# Patient Record
Sex: FEMALE | Race: WHITE
[De-identification: names, ages, dates, MRNs, and addresses within clinical notes are randomized per-mention and may not be internally consistent; named-entity substitution may affect disease eponyms.]

---

## 2018-02-07 ENCOUNTER — HOSPITAL ENCOUNTER (EMERGENCY)
Dept: HOSPITAL 74 - JER | Age: 53
Discharge: HOME | End: 2018-02-07
Payer: COMMERCIAL

## 2018-02-07 VITALS — HEART RATE: 77 BPM | TEMPERATURE: 98.5 F | DIASTOLIC BLOOD PRESSURE: 61 MMHG | SYSTOLIC BLOOD PRESSURE: 122 MMHG

## 2018-02-07 VITALS — BODY MASS INDEX: 20.7 KG/M2

## 2018-02-07 DIAGNOSIS — B57.2: ICD-10-CM

## 2018-02-07 DIAGNOSIS — E78.00: ICD-10-CM

## 2018-02-07 DIAGNOSIS — I10: ICD-10-CM

## 2018-02-07 DIAGNOSIS — Z95.810: ICD-10-CM

## 2018-02-07 DIAGNOSIS — I42.8: ICD-10-CM

## 2018-02-07 DIAGNOSIS — R20.2: Primary | ICD-10-CM

## 2018-02-07 LAB
ALBUMIN SERPL-MCNC: 4.2 G/DL (ref 3.4–5)
ALP SERPL-CCNC: 139 U/L (ref 45–117)
ALT SERPL-CCNC: 40 U/L (ref 12–78)
ANION GAP SERPL CALC-SCNC: 9 MMOL/L (ref 8–16)
APPEARANCE UR: CLEAR
AST SERPL-CCNC: 19 U/L (ref 15–37)
BASOPHILS # BLD: 0.5 % (ref 0–2)
BILIRUB SERPL-MCNC: 0.4 MG/DL (ref 0.2–1)
BILIRUB UR STRIP.AUTO-MCNC: NEGATIVE MG/DL
BUN SERPL-MCNC: 22 MG/DL (ref 7–18)
CALCIUM SERPL-MCNC: 9.2 MG/DL (ref 8.5–10.1)
CHLORIDE SERPL-SCNC: 102 MMOL/L (ref 98–107)
CO2 SERPL-SCNC: 30 MMOL/L (ref 21–32)
COLOR UR: COLORLESS
CREAT SERPL-MCNC: 0.9 MG/DL (ref 0.55–1.02)
DEPRECATED RDW RBC AUTO: 12.9 % (ref 11.6–15.6)
EOSINOPHIL # BLD: 6.1 % (ref 0–4.5)
GLUCOSE SERPL-MCNC: 91 MG/DL (ref 74–106)
HCT VFR BLD CALC: 41 % (ref 32.4–45.2)
HGB BLD-MCNC: 13.7 GM/DL (ref 10.7–15.3)
KETONES UR QL STRIP: NEGATIVE
LEUKOCYTE ESTERASE UR QL STRIP.AUTO: (no result)
LYMPHOCYTES # BLD: 24.7 % (ref 8–40)
MAGNESIUM SERPL-MCNC: 2.4 MG/DL (ref 1.8–2.4)
MCH RBC QN AUTO: 30.3 PG (ref 25.7–33.7)
MCHC RBC AUTO-ENTMCNC: 33.4 G/DL (ref 32–36)
MCV RBC: 90.6 FL (ref 80–96)
MONOCYTES # BLD AUTO: 5.6 % (ref 3.8–10.2)
NEUTROPHILS # BLD: 63.1 % (ref 42.8–82.8)
NITRITE UR QL STRIP: NEGATIVE
PH UR: 6 [PH] (ref 5–8)
PHOSPHATE SERPL-MCNC: 4 MG/DL (ref 2.5–4.9)
PLATELET # BLD AUTO: 220 K/MM3 (ref 134–434)
PMV BLD: 9.6 FL (ref 7.5–11.1)
POTASSIUM SERPLBLD-SCNC: 4.2 MMOL/L (ref 3.5–5.1)
PROT SERPL-MCNC: 7.8 G/DL (ref 6.4–8.2)
PROT UR QL STRIP: NEGATIVE
PROT UR QL STRIP: NEGATIVE
RBC # BLD AUTO: 4.53 M/MM3 (ref 3.6–5.2)
RBC # UR STRIP: (no result) /UL
SODIUM SERPL-SCNC: 141 MMOL/L (ref 136–145)
SP GR UR: 1 (ref 1–1.03)
UROBILINOGEN UR STRIP-MCNC: NEGATIVE MG/DL (ref 0.2–1)
WBC # BLD AUTO: 7 K/MM3 (ref 4–10)

## 2018-02-07 NOTE — PDOC
History of Present Illness





- General


History Source: Patient


Exam Limitations: No Limitations





- History of Present Illness


Initial Comments: 


This is a 52 YOF with h/o Chagas disease with nonischemic cardiomyopathy, s/p 

ablation and defibrillator placement for v-tach (2014), CHF, HTN, HLD, and GERD 

who p/w a month of worsening numbness to a patch of skin on the palmar surface 

of her left forearm and wrist, now with an uncomfortable electricity-like 

sensation to the same area for the past few days. She notes many other 

additional symptoms that are chronic, including frontal headache (yesterday but 

none today), lightheadedness, and left-sided abdominal pain. She denies any 

chest pain, SOB, fever, chills, nausea, vomiting, diarrhea, constipation, rash, 

recent injuries, or other recent symptoms. She has been previously evaluated 

for this at an outside hospital and states she had a normal workup, and was 

given a neurology referral, but she states they would not see her because they 

do not see French-only-speaking patients.





<Brianne Rodriguez - Last Filed: 02/07/18 17:14>





<Norberto Cleaning - Last Filed: 02/07/18 18:28>





- General


Chief Complaint: Pain


Stated Complaint: LT ARM PAIN/numbness/tingling


Time Seen by Provider: 02/07/18 15:30





Past History





- Past Medical History


Cardiac Disorders: Yes (idiopathic cardiomyopathy, ventrical tachycardia,

defibrilator)


COPD: No


HTN: Yes


Hypercholesterolemia: Yes





- Surgical History


Cardiac Surgery: Yes (defib)





- Suicide/Smoking/Psychosocial Hx


Smoking History: Never smoked


Have you smoked in the past 12 months: No


Information on smoking cessation initiated: No


Hx Alcohol Use: No


Drug/Substance Use Hx: No


Substance Use Type: None


Hx Substance Use Treatment: No





<Brianne Rodriguez - Last Filed: 02/07/18 17:14>





<Norberto Cleaning - Last Filed: 02/07/18 18:28>





- Past Medical History


Allergies/Adverse Reactions: 


 Allergies











Allergy/AdvReac Type Severity Reaction Status Date / Time


 


No Known Allergies Allergy   Unverified 02/07/18 12:52











Home Medications: 


Ambulatory Orders





Acetaminophen [Pain Reliever] 500 mg PO Q12H PRN #0 06/24/16 


Multivit-Min/Iron Fum/Folic AC [Multi-Vitamin-Minerals Tablet] 1 each PO DAILY #

0 06/24/16 


Pantoprazole Sodium [Protonix -] 40 mg PO DAILY #0 06/24/16 


Metoprolol Succinate [Toprol XL -] 12.5 mg PO DAILY #30 tab.sr.24h 12/29/16 


Amiodarone HCl 100 mg PO DAILY 02/07/18 


Buspirone HCl [Buspar -] 5 mg PO BID 02/07/18 


Losartan Potassium [Cozaar -] 25 mg PO DAILY 02/07/18 


Mexiletine HCl 150 mg PO BID 02/07/18 


Spironolactone 12.5 mg PO DAILY 02/07/18 


hydrOXYzine PAMOATE [Vistaril -] 25 mg PO BID 02/07/18 











*Physical Exam





- Vital Signs


 Last Vital Signs











Temp Pulse Resp BP Pulse Ox


 


 98.5 F   77   18   122/61   99 


 


 02/07/18 12:49  02/07/18 12:49  02/07/18 12:49  02/07/18 12:49  02/07/18 12:49














- Physical Exam


General Appearance: Yes: Nourished, Appropriately Dressed, Other (mildly 

anxious appearing, nontoxic adult female, answering questions appropriately).  

No: Apparent Distress


HEENT: positive: EOMI, JERRELL, Normal ENT Inspection, Normal Voice, Hearing 

Grossly Normal.  negative: Scleral Icterus (R), Scleral Icterus (L), Nasal 

Congestion


Neck: positive: Trachea midline, Normal Thyroid, Supple.  negative: Tender, 

Rigid


Respiratory/Chest: positive: Lungs Clear, Normal Breath Sounds, Other (AICD in 

place LUCW).  negative: Chest Tender, Respiratory Distress, Crackles, Rhonchi, 

Stridor, Wheezing


Cardiovascular: positive: Regular Rhythm, Regular Rate, Irregularly Irregular.  

negative: Edema, Murmur


Gastrointestinal/Abdominal: positive: Normal Bowel Sounds, Soft.  negative: 

Tender, Organomegaly, Pulsatile Mass, Guarding


Musculoskeletal: positive: Normal Inspection.  negative: Decreased Range of 

Motion, Vertebral Tenderness


Extremity: positive: Normal Capillary Refill, Normal Inspection, Normal Range 

of Motion.  negative: Tender, Cyanosis


Integumentary: positive: Normal Color, Dry, Warm.  negative: Erythema, Rash, 

Bruising


Neurologic: positive: CNs II-XII NML intact, Fully Oriented, Alert, Normal Mood/

Affect, Normal Response, Motor Strength 5/5





<Brianne Rodriguez - Last Filed: 02/07/18 17:14>





- Vital Signs


 Last Vital Signs











Temp Pulse Resp BP Pulse Ox


 


 98.5 F   77   18   122/61   99 


 


 02/07/18 12:49  02/07/18 12:49  02/07/18 12:49  02/07/18 12:49  02/07/18 12:49














<Norberto Cleaning - Last Filed: 02/07/18 18:28>





ED Treatment Course





- LABORATORY


CBC & Chemistry Diagram: 


 02/07/18 17:00





 02/07/18 17:00





<Brianne Rodriguez - Last Filed: 02/07/18 17:14>





- LABORATORY


CBC & Chemistry Diagram: 


 02/07/18 17:00





 02/07/18 17:00





- ADDITIONAL ORDERS


Additional order review: 


 Laboratory  Results











  02/07/18 02/07/18





  17:00 17:00


 


Sodium  141 


 


Potassium  4.2 


 


Chloride  102 


 


Carbon Dioxide  30 


 


Anion Gap  9 


 


BUN  22 H 


 


Creatinine  0.9 


 


Creat Clearance w eGFR  > 60 


 


Random Glucose  91 


 


Calcium  9.2 


 


Magnesium  2.4 


 


Total Bilirubin  0.4  D 


 


AST  19 


 


ALT  40 


 


Alkaline Phosphatase  139 H 


 


Creatine Kinase  51 


 


Troponin I  < 0.02 


 


Total Protein  7.8 


 


Albumin  4.2 


 


Urine Color   Colorless


 


Urine Appearance   Clear


 


Urine pH   6.0


 


Ur Specific Gravity   1.003


 


Urine Protein   Negative


 


Urine Glucose (UA)   Negative


 


Urine Ketones   Negative


 


Urine Blood   1+ H


 


Urine Nitrite   Negative


 


Urine Bilirubin   Negative


 


Urine Urobilinogen   Negative


 


Ur Leukocyte Esterase   3+ H


 


Urine WBC (Auto)   71


 


Urine RBC (Auto)   1








 











  02/07/18





  17:00


 


RBC  4.53


 


MCV  90.6


 


MCHC  33.4


 


RDW  12.9


 


MPV  9.6


 


Neutrophils %  63.1


 


Lymphocytes %  24.7  D


 


Monocytes %  5.6


 


Eosinophils %  6.1 H


 


Basophils %  0.5














<Norberto Cleaning - Last Filed: 02/07/18 18:28>





*DC/Admit/Observation/Transfer





<Brianne Rodriguez - Last Filed: 02/07/18 17:14>





<Norberto Cleaning - Last Filed: 02/07/18 18:28>


Diagnosis at time of Disposition: 


 Paresthesia








- Discharge Dispostion


Disposition: HOME





- Referrals


Referrals: 


Morales Colbert MD [Staff Physician] - 


Jack Clark MD [Staff Physician] - 





- Patient Instructions


Printed Discharge Instructions:  DI for Carpal Tunnel Syndrome, DI for Numbness/

tingling


Additional Instructions: 


Follow up with an orthopedic surgeon and a neurologist for further evaluation 

of your numbness and tingling. You may have carpal tunnel syndrome.


If you experience worsening numbness, weakness, or any other concerning symptoms

, return to the ER immediately.





[place] un seguimiento con un cirujano ortopdico y un neurlogo para jamari mayor 

evaluacin de lema entumecimiento y hormigueo. Puede tener el sndrome del tnel 

bhakti.


Si experimenta adormecimiento empeoramiento, debilidad o cualquier otro sntoma 

preocupante, regrese a la eileen de emergencias inmediatamente.


Print Language: Malagasy

## 2018-02-07 NOTE — PDOC
Attending Attestation





- Resident


Resident Name: Brianne Rodriguez





- ED Attending Attestation


I have performed the following: I have examined & evaluated the patient, The 

case was reviewed & discussed with the resident, I agree w/resident's findings 

& plan, Exceptions are as noted





- HPI


HPI: 





02/07/18 17:55


The patient is a 52 year old female, with a significant past medical history of 

Chagas, cardiomyopathy, AICD and ablation (2014), CHF, hypertension, 

hypercholesterolemia, who presents to the emergency department with one month 

of increased tingling to the volar aspect of left wrist. She localizes the 

symptoms to a patch on her forearm which started as a numbness and has now 

progressed to a tingling. She denies use of medication for her symptoms. Pt 

denies weakness in the arm. Denies neck pain. Denies numbness/tingling/weakness 

in any other extremity. Denies HA/N/V.


The patient denies chest pain, shortness of breath, headache and dizziness.    


The patient denies palpitations or arrhythmias. The patient denies AICD firing. 


The patient denies fever, chills, nausea, vomit, diarrhea and constipation. 


The patient denies dysuria, frequency, urgency and hematuria. 





Allergies: NKDA


"





- Physicial Exam


PE: 





02/07/18 17:56


GENERAL: Awake, alert, and fully oriented, in no acute distress


HEAD: No signs of trauma


EYES: PERRLA, EOMI, sclera anicteric, conjunctiva clear


ENT: Auricles normal inspection, hearing grossly normal, nares patent, 

oropharynx clear without exudates. Moist mucosa


NECK: Nontender, no stepoffs, Normal ROM, supple, no lymphadenopathy, JVD, or 

masses


LUNGS: Breath sounds equal, clear to auscultation bilaterally.  No wheezes, and 

no crackles


HEART: Regular rate and rhythm, normal S1 and S2, no murmurs, rubs or gallops


ABDOMEN: Soft, nontender, normoactive bowel sounds.  No guarding, no rebound.  

No masses


EXTREMITIES: Normal range of motion, no edema.  No clubbing or cyanosis. No 

cords, erythema, or tenderness


NEUROLOGICAL: Cranial nerves II through XII intact. 5/5 strength and sensation 

in all extremities, Normal speech, normal gait


+ diminished sensation on volar aspect of L wrist from wrist to mid forearm


SKIN: Warm, Dry, normal turgor, no rashes or lesions noted.








- Medical Decision Making





02/07/18 17:56


52 F with numbness and tingling to L wrist. Likely carpal tunnel. Pt with no 

significant neuro deficits other than diminished sensation to a small patch in 

her L wrist. Not consistent with any stroke syndrome. Not consistent with any 

dermatomal distribution either. Possible peripheral neuropathy. 


- F/u ortho or neuro

## 2018-02-08 NOTE — EKG
Test Reason : 

Blood Pressure : ***/*** mmHG

Vent. Rate : 072 BPM     Atrial Rate : 072 BPM

   P-R Int : 188 ms          QRS Dur : 082 ms

    QT Int : 434 ms       P-R-T Axes : 071 059 036 degrees

   QTc Int : 475 ms

 

*** POOR DATA QUALITY, INTERPRETATION MAY BE ADVERSELY AFFECTED

NORMAL SINUS RHYTHM

NORMAL ECG

WHEN COMPARED WITH ECG OF 22-JUN-2016 11:20,

[place] INTERVAL HAS DECREASED

RIGHT BUNDLE BRANCH BLOCK IS NO LONGER PRESENT

Confirmed by KAELYN CANNON MD (2014) on 2/8/2018 12:06:23 PM

 

Referred By:             Confirmed By:KAELYN CANNON MD

## 2018-09-24 ENCOUNTER — HOSPITAL ENCOUNTER (EMERGENCY)
Dept: HOSPITAL 74 - JERFT | Age: 53
Discharge: HOME | End: 2018-09-24
Payer: COMMERCIAL

## 2018-09-24 VITALS — TEMPERATURE: 98.5 F | DIASTOLIC BLOOD PRESSURE: 83 MMHG | SYSTOLIC BLOOD PRESSURE: 137 MMHG | HEART RATE: 67 BPM

## 2018-09-24 VITALS — BODY MASS INDEX: 20.7 KG/M2

## 2018-09-24 DIAGNOSIS — Z95.810: ICD-10-CM

## 2018-09-24 DIAGNOSIS — E78.00: ICD-10-CM

## 2018-09-24 DIAGNOSIS — S16.1XXA: Primary | ICD-10-CM

## 2018-09-24 DIAGNOSIS — R00.0: ICD-10-CM

## 2018-09-24 DIAGNOSIS — I10: ICD-10-CM

## 2018-09-24 DIAGNOSIS — I42.8: ICD-10-CM

## 2018-09-24 NOTE — PDOC
Rapid Medical Evaluation


Chief Complaint: Pain, Acute


Time Seen by Provider: 09/24/18 14:45


Medical Evaluation: 


 Allergies











Allergy/AdvReac Type Severity Reaction Status Date / Time


 


No Known Allergies Allergy   Unverified 09/24/18 14:45








 Vital Signs











Temp Pulse Resp BP Pulse Ox


 


 98.5 F   67   18   137/83   100 


 


 09/24/18 14:46  09/24/18 14:46  09/24/18 14:46  09/24/18 14:46  09/24/18 14:46











09/24/18 14:48


The patient presents with a chief complaint of: neck pain


 I have performed a brief in-person evaluation of this patient.


 Pertinent physical exam findings: vss, stable


 I have ordered the following:  tylenol


 The patient will proceed to the ED for further evaluation.


09/24/18 15:44








**Discharge Disposition





- Diagnosis


 Cervical strain








- Discharge Dispostion


Disposition: HOME


Condition at time of disposition: Stable





- Referrals


Referrals: 


Morales Graves MD [Staff Physician] - 





- Patient Instructions


Printed Discharge Instructions:  DI for Cervical Muscle Strain


Additional Instructions: 


regresar a la eileen de emergencias si los sntomas empeoran o no se resuelven. 

Por favor, radha un seguimiento con la ciruga de la columna vertebral para jamari 

mayor evaluacin y opciones de tratamiento. l puede ashly Tylenol y Motrin en 

casa para el dolor segn las indicaciones. Seguimiento con ciruga de columna 

en 1-2 hsu.


Print Language: Sammarinese





- Post Discharge Activity

## 2018-09-24 NOTE — PDOC
History of Present Illness





- General


Chief Complaint: Pain, Acute


Stated Complaint: NECK PAIN


Time Seen by Provider: 09/24/18 14:45





- History of Present Illness


Initial Comments: 


52-year-old female without comorbidities presents for evaluation of 3 days of 

atraumatic onset of right-sided neck pain without radicular symptoms.


09/24/18 15:21








Past History





- Past Medical History


Allergies/Adverse Reactions: 


 Allergies











Allergy/AdvReac Type Severity Reaction Status Date / Time


 


No Known Allergies Allergy   Unverified 09/24/18 14:45











Home Medications: 


Ambulatory Orders





Multivit-Min/Iron Fum/Folic AC [Multi-Vitamin-Minerals Tablet] 1 each PO DAILY #

0 06/24/16 


Pantoprazole Sodium [Protonix -] 40 mg PO DAILY #0 06/24/16 


Metoprolol Succinate [Toprol XL -] 12.5 mg PO DAILY #30 tab.sr.24h 12/29/16 


Amiodarone HCl 100 mg PO DAILY 02/07/18 


Buspirone HCl [Buspar -] 5 mg PO BID 02/07/18 


Losartan Potassium [Cozaar -] 25 mg PO DAILY 02/07/18 


Mexiletine HCl 150 mg PO BID 02/07/18 


Spironolactone 12.5 mg PO DAILY 02/07/18 


hydrOXYzine PAMOATE [Vistaril -] 25 mg PO BID 02/07/18 








Cardiac Disorders: Yes (idiopathic cardiomyopathy, ventrical tachycardia,

defibrilator)


COPD: No


HTN: Yes


Hypercholesterolemia: Yes





- Surgical History


Cardiac Surgery: Yes (defib)





- Suicide/Smoking/Psychosocial Hx


Smoking History: Never smoked


Have you smoked in the past 12 months: No


Hx Alcohol Use: No


Drug/Substance Use Hx: No


Substance Use Type: None


Hx Substance Use Treatment: No





**Review of Systems





- Review of Systems


Musculoskeletal: Yes: Neck Pain


All Other Systems: Reviewed and Negative





*Physical Exam





- Vital Signs


 Last Vital Signs











Temp Pulse Resp BP Pulse Ox


 


 98.5 F   67   18   137/83   100 


 


 09/24/18 14:46  09/24/18 14:46  09/24/18 14:46  09/24/18 14:46  09/24/18 14:46














- Physical Exam


Comments: 





09/24/18 15:21


Cervical spine skin color and temperature are normal range of motion is 

slightly decreased. She has no midline tenderness. Mild right sided 

sternoclavicular and paracervical musculature spasm and tenderness. 5 out of 5 

strength in bilateral upper extremities without any gross sensorimotor 

deficits. She is unable to tolerate Spurling maneuver. She has no meningismus. 

NeuroVascular intact.





*DC/Admit/Observation/Transfer


Diagnosis at time of Disposition: 


 Cervical strain








- Discharge Dispostion


Disposition: HOME


Condition at time of disposition: Stable


Decision to Admit order: No





- Referrals


Referrals: 


Morales Graves MD [Staff Physician] - 





- Patient Instructions


Printed Discharge Instructions:  DI for Cervical Muscle Strain


Additional Instructions: 


regresar a la eileen de emergencias si los sntomas empeoran o no se resuelven. 

Por favor, radha un seguimiento con la ciruga de la columna vertebral para jamari 

mayor evaluacin y opciones de tratamiento. l puede ashly Tylenol y Motrin en 

casa para el dolor segn las indicaciones. Seguimiento con ciruga de columna 

en 1-2 hsu.


Print Language: Lebanese





- Post Discharge Activity

## 2020-07-02 ENCOUNTER — HOSPITAL ENCOUNTER (EMERGENCY)
Dept: HOSPITAL 74 - JER | Age: 55
Discharge: ANOTHER HOSPITAL | End: 2020-07-02
Payer: COMMERCIAL

## 2020-07-02 VITALS — BODY MASS INDEX: 21.7 KG/M2

## 2020-07-02 VITALS — SYSTOLIC BLOOD PRESSURE: 149 MMHG | DIASTOLIC BLOOD PRESSURE: 88 MMHG | HEART RATE: 79 BPM

## 2020-07-02 VITALS — TEMPERATURE: 98.8 F

## 2020-07-02 DIAGNOSIS — B57.2: ICD-10-CM

## 2020-07-02 DIAGNOSIS — R07.9: Primary | ICD-10-CM

## 2020-07-02 LAB
ALBUMIN SERPL-MCNC: 4.1 G/DL (ref 3.4–5)
ALP SERPL-CCNC: 98 U/L (ref 45–117)
ALT SERPL-CCNC: 43 U/L (ref 13–61)
ANION GAP SERPL CALC-SCNC: 6 MMOL/L (ref 8–16)
AST SERPL-CCNC: 29 U/L (ref 15–37)
BASOPHILS # BLD: 0.5 % (ref 0–2)
BILIRUB SERPL-MCNC: 0.9 MG/DL (ref 0.2–1)
BNP SERPL-MCNC: 440.1 PG/ML (ref 5–125)
BUN SERPL-MCNC: 24.4 MG/DL (ref 7–18)
CALCIUM SERPL-MCNC: 9.3 MG/DL (ref 8.5–10.1)
CHLORIDE SERPL-SCNC: 104 MMOL/L (ref 98–107)
CO2 SERPL-SCNC: 30 MMOL/L (ref 21–32)
CREAT SERPL-MCNC: 1.2 MG/DL (ref 0.55–1.3)
DEPRECATED RDW RBC AUTO: 13.6 % (ref 11.6–15.6)
EOSINOPHIL # BLD: 3 % (ref 0–4.5)
GLUCOSE SERPL-MCNC: 87 MG/DL (ref 74–106)
HCT VFR BLD CALC: 38.9 % (ref 32.4–45.2)
HGB BLD-MCNC: 12.8 GM/DL (ref 10.7–15.3)
LYMPHOCYTES # BLD: 20.5 % (ref 8–40)
MAGNESIUM SERPL-MCNC: 2.4 MG/DL (ref 1.8–2.4)
MCH RBC QN AUTO: 30.4 PG (ref 25.7–33.7)
MCHC RBC AUTO-ENTMCNC: 33 G/DL (ref 32–36)
MCV RBC: 92.1 FL (ref 80–96)
MONOCYTES # BLD AUTO: 9.1 % (ref 3.8–10.2)
NEUTROPHILS # BLD: 66.9 % (ref 42.8–82.8)
PLATELET # BLD AUTO: 234 K/MM3 (ref 134–434)
PMV BLD: 9.5 FL (ref 7.5–11.1)
POTASSIUM SERPLBLD-SCNC: 4.5 MMOL/L (ref 3.5–5.1)
PROT SERPL-MCNC: 7.3 G/DL (ref 6.4–8.2)
RBC # BLD AUTO: 4.22 M/MM3 (ref 3.6–5.2)
SODIUM SERPL-SCNC: 139 MMOL/L (ref 136–145)
WBC # BLD AUTO: 6.2 K/MM3 (ref 4–10)

## 2020-07-02 PROCEDURE — 3E033GC INTRODUCTION OF OTHER THERAPEUTIC SUBSTANCE INTO PERIPHERAL VEIN, PERCUTANEOUS APPROACH: ICD-10-PCS | Performed by: EMERGENCY MEDICINE

## 2020-07-02 NOTE — PDOC
Documentation entered by Maria G Conner SCRIBE, acting as scribe for 

Princess Sheehan MD.








Princess Sheehan MD:  This documentation has been prepared by the Tamra gould Nirvannie, SCRIBE, under my direction and personally reviewed by me in its

entirety.  I confirm that the documentation accurately reflects all work, 

treatment, procedures, and medical decision making performed by me.  





Attending Attestation





- Resident


Resident Name: Jon Ochoa





- ED Attending Attestation


I have performed the following: I have examined & evaluated the patient, The 

case was reviewed & discussed with the resident, I agree w/resident's findings &

plan, Exceptions are as noted





- HPI


HPI: 





07/02/20 13:15


The patient is a 54 year old female with a significant past medical history of 

Chagas disease with nonischemic cardiomyopathy, s/p ablation and defibrillator 

placement for v-tach (14), CHF, HTN, HLD, and GERD who presents to the ED s/p 4

defibrillator shocks with left-sided chest pressure at 10am this morning.





[employer]: [phone]


[employer] 7 VR-T DX DF-1        Serial Number: [id number]      12/3/14


[employer] Smart S DX 65/15       Serial Number: [id number]       12/3/14





Dr. Mart Luu [phone]





- Physicial Exam


PE: 





07/02/20 14:06


Agree with resident exam.  Patient is alert and oriented and in no acute 

distress.  CV: rr no m/r/g pulm: cta b/l  abdomen soft, non tender, non 

distended  ext no edema or tenderness. 





- Medical Decision Making





07/02/20 14:08


Pt presents to the ED complaining of 4 shocks from her AICD, lightheadness and 

mild chest pain.  Pacer interrogation shows sustained VT of 3 min, not resolved 

by pacing x 9 or shocks x4.  Returned to sinus spontaneously.  Concern for 

accessory pathway, ACS, less likely electrolyte disturbance. EKG shows sinus 

rhythm with normal QT.  Will discuss with primary cardiologist--possible 

transfer to [facility] for possible EP study.


07/02/20 14:12





07/02/20 14:20








Discharge





- Discharge Information


Problems reviewed: Yes


Clinical Impression/Diagnosis: 


 Chagas disease





Chest pain


Qualifiers:


 Chest pain type: unspecified Qualified Code(s): R07.9 - Chest pain, unspecified





Condition: Stable


Disposition: TRANSFER ACUTE CARE/OTHER HOSP





- Follow up/Referral


Referrals: 


Krysten Penny [Primary Care Provider] - 





- Patient Discharge Instructions





- Post Discharge Activity

## 2020-07-02 NOTE — PDOC
History of Present Illness





- General


Chief Complaint: Chest Pain


Stated Complaint: Chest Pain


Time Seen by Provider: 07/02/20 12:27


History Source: Patient


Exam Limitations: No Limitations





- History of Present Illness


Initial Comments: 





07/02/20 13:04


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 

ICD placement in 2018 presenting with palpitations and ICD activation today.





Patient was taking a shower at 10AM when she felt palpitations, was shocked 4x 

and felt some chest pain and dizziness after, but did not lose consciousness or 

hit head. Presented to ED for further evaluation. Denies recent illness, covid-

19 infection, fever, chills, N/V/D, urinary sx. Currently feels soreness in her 

chest after ICD shocks but denies any other symptoms. Initially thought 

palpitations were related to amiodarone side effect.





Has known history of cardiac arrhythmia, followed at [facility]. S/p ablation in

2016, but continued to have breakthrough palpitations, ICD placed in 2018. Last 

episode of palpitations 6 months ago, has been intermittently shocked since 

then.





EP: Gross


Cards: [facility]





Meds: amiodarone, mexilitine, losartan, buspirone, metoprolol





ICD Card:





[employer]: [phone]





[employer] 7 VR-T DX DF-1        Serial Number: [id number]      12/3/14


[employer] Smart S DX 65/15       Serial Number: [id number]      12/3/14








Past History





- Medical History


Allergies/Adverse Reactions: 


                                    Allergies











Allergy/AdvReac Type Severity Reaction Status Date / Time


 


No Known Allergies Allergy   Unverified 07/02/20 11:50











Home Medications: 


Ambulatory Orders





Multivit-Min/Iron Fum/Folic AC [Multi-Vitamin-Minerals Tablet] 1 each PO DAILY 

#0 06/24/16 


Pantoprazole Sodium [Protonix -] 40 mg PO DAILY #0 06/24/16 


Metoprolol Succinate [Toprol XL -] 12.5 mg PO DAILY #30 tab.sr.24h 12/29/16 


Amiodarone HCl 100 mg PO DAILY 02/07/18 


Buspirone HCl [Buspar -] 5 mg PO BID 02/07/18 


Losartan Potassium [Cozaar -] 25 mg PO DAILY 02/07/18 


Mexiletine HCl 150 mg PO BID 02/07/18 








Cardiac Disorders: Yes (idiopathic cardiomyopathy, ventrical 

tachycardia,defibrilator)


COPD: No


HTN: Yes


Hypercholesterolemia: Yes





- Surgical History


Cardiac Surgery: Yes (defib)





- Immunization History


Immunization Up to Date: Yes





- Psycho-Social/Smoking History


Smoking History: Never smoked


Have you smoked in the past 12 months: No





- Substance Abuse Hx (Audit-C & DAST Scrn)


How often the patient has a drink containing alcohol: Never


Score: In Men: 4 or > Positive; In Women: 3 or > Positive: 0


Screen Result (Pos requires Nsg. Audit-10AR): Negative


In the last yr the pt used illegal drug/Rx for NonMed reason: No


Score:  Yes response is considered Positive: 0


Screen Result (Positive result requires Nsg. DAST-10): Negative





**Review of Systems





- Review of Systems


Able to Perform ROS?: Yes


Constitutional: No: Symptoms Reported


HEENTM: No: Symptoms Reported


Respiratory: No: Symptoms reported


Cardiac (ROS): Yes: Chest Pain, Irregular Heart Rate, Palpitations


ABD/GI: No: Symptoms Reported


: No: Symptoms Reported


Musculoskeletal: No: Symptoms Reported


Integumentary: No: Symptoms Reported


Neurological: No: Symptoms reported


Endocrine: No: Symptoms Reported


Hematologic/Lymphatic: No: Symptoms Reported


All Other Systems: Reviewed and Negative





*Physical Exam





- Vital Signs


                                Last Vital Signs











Temp Pulse Resp BP Pulse Ox


 


 98.8 F   79   16   149/88   98 


 


 07/02/20 18:24  07/02/20 18:24  07/02/20 18:24  07/02/20 18:24  07/02/20 18:15














- Physical Exam


General Appearance: Yes: Nourished, Appropriately Dressed.  No: Apparent 

Distress


HEENT: positive: EOMI, JERRELL, Normal Voice, Symmetrical, Pharynx Normal.  

negative: Scleral Icterus (R), Scleral Icterus (L)


Neck: positive: Trachea midline, Normal Thyroid, Rigid, Supple.  negative: 

Tender, Lymphadenopathy (R), Lymphadenopathy (L)


Respiratory/Chest: positive: Lungs Clear, Normal Breath Sounds, Other (chest 

tenderness).  negative: Chest Tender, Respiratory Distress, Accessory Muscle 

Use, Crackles, Rales, Rhonchi, Stridor, Wheezing


Cardiovascular: positive: Regular Rhythm, Regular Rate.  negative: Murmur


Gastrointestinal/Abdominal: positive: Normal Bowel Sounds, Flat, Soft.  

negative: Tender, Organomegaly, Pulsatile Mass


Musculoskeletal: positive: Normal Inspection.  negative: CVA Tenderness, CVA 

Tenderness (R), CVA Tenderness (L), Decreased Range of Motion


Extremity: positive: Normal Capillary Refill, Normal Inspection, Normal Range of

Motion.  negative: Tender, Pelvis Stable


Integumentary: positive: Normal Color, Dry, Warm


Neurologic: positive: Fully Oriented, Alert, Normal Mood/Affect, Normal 

Response, Motor Strength 5/5





ED Treatment Course





- LABORATORY


CBC & Chemistry Diagram: 


                                 07/02/20 12:00





                                 07/02/20 12:00





- ADDITIONAL ORDERS


Additional order review: 


                               Laboratory  Results











  07/02/20





  12:00


 


Magnesium  2.4


 


B-Natriuretic Peptide  440.1 H








                                        











  07/02/20 07/02/20





  13:49 12:00


 


RBC   4.22


 


MCV   92.1


 


MCHC   33.0


 


RDW   13.6


 


MPV   9.5


 


Neutrophils %   66.9


 


Lymphocytes %   20.5


 


Monocytes %   9.1


 


Eosinophils %   3.0


 


Basophils %   0.5


 


POC Glucometer  102 














- RADIOLOGY


Radiology Studies Ordered: 














 Category Date Time Status


 


 CHEST X-RAY PORTABLE* [RAD] Stat Radiology  07/02/20 12:27 Completed














- Medications


Given in the ED: 


ED Medications














Discontinued Medications














Generic Name Dose Route Start Last Admin





  Trade Name Freq  PRN Reason Stop Dose Admin


 


Aspirin  324 mg  07/02/20 14:40  07/02/20 14:47





  Asa -  PO  07/02/20 14:41  324 mg





  ONCE ONE   Administration


 


Amiodarone HCl 150 mg/  103 mls @ 618 mls/hr  07/02/20 16:50  07/02/20 18:10





  Dextrose  IVPB  07/02/20 16:59  618 mls/hr





  ONCE ONE   Administration


 


Amiodarone HCl 450 mg/  250 mls @ 33.333 mls/hr  07/02/20 16:50  07/02/20 18:20





  Dextrose  IVPB  07/03/20 00:19  1 mg/min





  TITR ONE   33.333 mls/hr





    Administration





  Protocol  





  1 MG/MIN  














Medical Decision Making





- Medical Decision Making





07/02/20 18:13


Patient presents with chest pain, palpitations, and ICD activation x4 today. Now

 here for cardiac evaluation. Possible tachyarrhythmia vs. ICD malfunction needs

 to be elucidated.





Getting CBC, CMP, CP, ECG, CXR, TSH, Mag, for evaluation cardiac etiology. 

Called [employer] for interrogation. VSS, no arrthythmia.





Interrogation shows 3 mins sustained VT unresolved by pacing x9 or with 

defibrillation x4, patient back to NSR spontaneously.





Patient feeling chest pain 2/2 shocks but no other palpitations.





Labs notable for:


- trop 0.22


- CBC WNL


- CMP WNL





CXR WNL


ECG NSR with RBBB, no other abnormalities.





Given severity of arrhythmia, patient requires EP evaluation and likely 2nd 

ablation and ICD maintenance.


Called office of Dr. Urias, spoke to on-calll cardiology attending who 

recommended transfer to [facility], unable to reach EP service or Dr. Urias, was 

unable to accept transfer himself.





Discussed case with CCU fellow Dr. Caceres with [facility] cardiology, aware of 

patient and need for EP examination. Accepted by Dr. Ellison for ED to ED 

transfer.





Patient accepts transfer, transferred from ED.








Discharge





- Discharge Information


Problems reviewed: Yes


Clinical Impression/Diagnosis: 


 Chagas disease





Chest pain


Qualifiers:


 Chest pain type: unspecified Qualified Code(s): R07.9 - Chest pain, unspecified





Condition: Stable


Disposition: TRANSFER ACUTE CARE/OTHER HOSP





- Follow up/Referral


Referrals: 


Krysten Penny [Primary Care Provider] - 





- Patient Discharge Instructions





- Post Discharge Activity

## 2020-07-03 NOTE — EKG
Test Reason : 

Blood Pressure : ***/*** mmHG

Vent. Rate : 082 BPM     Atrial Rate : 082 BPM

   P-R Int : 208 ms          QRS Dur : 132 ms

    QT Int : 372 ms       P-R-T Axes : 068 021 028 degrees

   QTc Int : 434 ms

 

NORMAL SINUS RHYTHM WITH 1ST DEGREE A-V BLOCK

RIGHT BUNDLE BRANCH BLOCK

ABNORMAL ECG

WHEN COMPARED WITH ECG OF 07-FEB-2018 15:54,

RIGHT BUNDLE BRANCH BLOCK IS NOW PRESENT

Confirmed by MENDY EDL CID MD ([id number]) on 7/3/2020 10:50:34 AM

 

Referred By:             Confirmed By:MENDY DEL CID MD

## 2022-08-20 ENCOUNTER — HOSPITAL ENCOUNTER (EMERGENCY)
Dept: HOSPITAL 74 - JER | Age: 57
Discharge: HOME | End: 2022-08-20
Payer: COMMERCIAL

## 2022-08-20 VITALS
HEART RATE: 66 BPM | DIASTOLIC BLOOD PRESSURE: 65 MMHG | TEMPERATURE: 98.1 F | SYSTOLIC BLOOD PRESSURE: 137 MMHG | RESPIRATION RATE: 18 BRPM

## 2022-08-20 VITALS — BODY MASS INDEX: 20.7 KG/M2

## 2022-08-20 DIAGNOSIS — U07.1: Primary | ICD-10-CM

## 2022-08-20 LAB — S PYO DNA THROAT QL NAA+PROBE: NOT DETECTED

## 2022-12-06 ENCOUNTER — OFFICE (OUTPATIENT)
Dept: URBAN - METROPOLITAN AREA CLINIC 30 | Facility: CLINIC | Age: 57
Setting detail: OPHTHALMOLOGY
End: 2022-12-06
Payer: COMMERCIAL

## 2022-12-06 DIAGNOSIS — H16.223: ICD-10-CM

## 2022-12-06 DIAGNOSIS — H52.4: ICD-10-CM

## 2022-12-06 DIAGNOSIS — H10.13: ICD-10-CM

## 2022-12-06 PROCEDURE — 92015 DETERMINE REFRACTIVE STATE: CPT | Performed by: OPHTHALMOLOGY

## 2022-12-06 PROCEDURE — 92004 COMPRE OPH EXAM NEW PT 1/>: CPT | Performed by: OPHTHALMOLOGY

## 2022-12-06 ASSESSMENT — CONFRONTATIONAL VISUAL FIELD TEST (CVF)
OD_FINDINGS: FULL
OS_FINDINGS: FULL

## 2022-12-06 ASSESSMENT — REFRACTION_MANIFEST
OS_ADD: +2.25
OS_SPHERE: +1.00
OD_VA1: 20/30-1
OS_CYLINDER: +0.75
OS_VA1: 20/30-1
OD_AXIS: 105
OD_ADD: +2.25
OD_CYLINDER: +1.25
OD_SPHERE: +0.50
OS_AXIS: 70

## 2022-12-06 ASSESSMENT — REFRACTION_CURRENTRX
OS_OVR_VA: 20/
OD_SPHERE: +2.25
OD_OVR_VA: 20/
OS_AXIS: 75
OS_VPRISM_DIRECTION: SV
OS_CYLINDER: +1.00
OD_AXIS: 95
OD_VPRISM_DIRECTION: SV
OS_SPHERE: +2.25
OD_CYLINDER: +1.00

## 2022-12-06 ASSESSMENT — REFRACTION_AUTOREFRACTION
OD_AXIS: 105
OS_CYLINDER: +0.75
OD_SPHERE: 0.00
OS_SPHERE: +1.00
OS_AXIS: 70
OD_CYLINDER: +1.25

## 2022-12-06 ASSESSMENT — VISUAL ACUITY
OD_BCVA: 20/60+2
OS_BCVA: 20/40-1

## 2022-12-06 ASSESSMENT — TONOMETRY
OD_IOP_MMHG: 12
OS_IOP_MMHG: 12

## 2022-12-06 ASSESSMENT — SPHEQUIV_DERIVED
OS_SPHEQUIV: 1.375
OD_SPHEQUIV: 0.625
OS_SPHEQUIV: 1.375
OD_SPHEQUIV: 1.125